# Patient Record
Sex: FEMALE | Race: WHITE | URBAN - METROPOLITAN AREA
[De-identification: names, ages, dates, MRNs, and addresses within clinical notes are randomized per-mention and may not be internally consistent; named-entity substitution may affect disease eponyms.]

---

## 2018-07-13 ENCOUNTER — APPOINTMENT (OUTPATIENT)
Dept: GENERAL RADIOLOGY | Facility: CLINIC | Age: 83
End: 2018-07-13
Attending: EMERGENCY MEDICINE
Payer: COMMERCIAL

## 2018-07-13 ENCOUNTER — HOSPITAL ENCOUNTER (EMERGENCY)
Facility: CLINIC | Age: 83
Discharge: HOME | End: 2018-07-13
Attending: EMERGENCY MEDICINE | Admitting: EMERGENCY MEDICINE
Payer: COMMERCIAL

## 2018-07-13 VITALS
WEIGHT: 94 LBS | SYSTOLIC BLOOD PRESSURE: 170 MMHG | OXYGEN SATURATION: 95 % | RESPIRATION RATE: 16 BRPM | TEMPERATURE: 97.4 F | HEART RATE: 79 BPM | DIASTOLIC BLOOD PRESSURE: 96 MMHG | BODY MASS INDEX: 18.36 KG/M2

## 2018-07-13 DIAGNOSIS — I49.8 SINUS ARRHYTHMIA: ICD-10-CM
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
## 2018-07-13 DIAGNOSIS — S51.011A SKIN TEAR OF RIGHT ELBOW WITHOUT COMPLICATION, INITIAL ENCOUNTER: ICD-10-CM

## 2018-07-13 LAB
ANION GAP SERPL CALCULATED.3IONS-SCNC: 13 MMOL/L (ref 3–14)
BASOPHILS # BLD AUTO: 0 10E9/L (ref 0–0.2)
BASOPHILS NFR BLD AUTO: 0.3 %
BUN SERPL-MCNC: 46 MG/DL (ref 7–30)
CALCIUM SERPL-MCNC: 9.8 MG/DL (ref 8.5–10.1)
CHLORIDE SERPL-SCNC: 106 MMOL/L (ref 94–109)
CO2 SERPL-SCNC: 24 MMOL/L (ref 20–32)
CREAT SERPL-MCNC: 0.85 MG/DL (ref 0.52–1.04)
DIFFERENTIAL METHOD BLD: ABNORMAL
EOSINOPHIL # BLD AUTO: 0 10E9/L (ref 0–0.7)
EOSINOPHIL NFR BLD AUTO: 0.2 %
ERYTHROCYTE [DISTWIDTH] IN BLOOD BY AUTOMATED COUNT: 15.3 % (ref 10–15)
GFR SERPL CREATININE-BSD FRML MDRD: 62 ML/MIN/1.7M2
GLUCOSE SERPL-MCNC: 106 MG/DL (ref 70–99)
HCT VFR BLD AUTO: 44.2 % (ref 35–47)
HGB BLD-MCNC: 14.7 G/DL (ref 11.7–15.7)
IMM GRANULOCYTES # BLD: 0 10E9/L (ref 0–0.4)
IMM GRANULOCYTES NFR BLD: 0.2 %
LYMPHOCYTES # BLD AUTO: 1.3 10E9/L (ref 0.8–5.3)
LYMPHOCYTES NFR BLD AUTO: 14.1 %
MCH RBC QN AUTO: 32.2 PG (ref 26.5–33)
MCHC RBC AUTO-ENTMCNC: 33.3 G/DL (ref 31.5–36.5)
MCV RBC AUTO: 97 FL (ref 78–100)
MONOCYTES # BLD AUTO: 0.8 10E9/L (ref 0–1.3)
MONOCYTES NFR BLD AUTO: 9.1 %
NEUTROPHILS # BLD AUTO: 6.8 10E9/L (ref 1.6–8.3)
NEUTROPHILS NFR BLD AUTO: 76.1 %
NRBC # BLD AUTO: 0 10*3/UL
NRBC BLD AUTO-RTO: 0 /100
PLATELET # BLD AUTO: 260 10E9/L (ref 150–450)
POTASSIUM SERPL-SCNC: 4.5 MMOL/L (ref 3.4–5.3)
RBC # BLD AUTO: 4.56 10E12/L (ref 3.8–5.2)
SODIUM SERPL-SCNC: 143 MMOL/L (ref 133–144)
TSH SERPL DL<=0.005 MIU/L-ACNC: 3.63 MU/L (ref 0.4–4)
WBC # BLD AUTO: 8.9 10E9/L (ref 4–11)

## 2018-07-13 PROCEDURE — 71046 X-RAY EXAM CHEST 2 VIEWS: CPT

## 2018-07-13 PROCEDURE — 93005 ELECTROCARDIOGRAM TRACING: CPT

## 2018-07-13 PROCEDURE — 96360 HYDRATION IV INFUSION INIT: CPT

## 2018-07-13 PROCEDURE — 80048 BASIC METABOLIC PNL TOTAL CA: CPT | Performed by: EMERGENCY MEDICINE

## 2018-07-13 PROCEDURE — 99285 EMERGENCY DEPT VISIT HI MDM: CPT | Mod: 25

## 2018-07-13 PROCEDURE — 84443 ASSAY THYROID STIM HORMONE: CPT | Performed by: EMERGENCY MEDICINE

## 2018-07-13 PROCEDURE — 85025 COMPLETE CBC W/AUTO DIFF WBC: CPT | Performed by: EMERGENCY MEDICINE

## 2018-07-13 PROCEDURE — 25000128 H RX IP 250 OP 636: Performed by: EMERGENCY MEDICINE

## 2018-07-13 RX ADMIN — SODIUM CHLORIDE 500 ML: 9 INJECTION, SOLUTION INTRAVENOUS at 13:22

## 2018-07-13 ASSESSMENT — ENCOUNTER SYMPTOMS
ABDOMINAL PAIN: 0
NECK PAIN: 0
BACK PAIN: 0
SHORTNESS OF BREATH: 0
WOUND: 1
ARTHRALGIAS: 1
NAUSEA: 0

## 2018-07-13 NOTE — ED AVS SNAPSHOT
Emergency Department    [address] [place] [address]    Phone:  [phone]    Fax:  [phone]                                       Elham Liu   MRN: [id number]    Department:   Emergency Department   Date of Visit:  7/13/2018           After Visit Summary Signature Page     I have received my discharge instructions, and my questions have been answered. I have discussed any challenges I see with this plan with the nurse or doctor.    ..........................................................................................................................................  Patient/Patient Representative Signature      ..........................................................................................................................................  Patient Representative Print Name and Relationship to Patient    ..................................................               ................................................  Date                                            Time    ..........................................................................................................................................  Reviewed by Signature/Title    ...................................................              ..............................................  Date                                                            Time

## 2018-07-13 NOTE — DISCHARGE INSTRUCTIONS
* LACERATION (All Closures)  A laceration is a cut through the skin. This will usually require stitches (sutures) or staples if it is deep. Minor cuts may be treated with a tape closure ( Steri-Strips ) or Dermabond skin glue.       HOME CARE:  PAIN MEDICINE: You may use acetaminophen (Tylenol) 650-1000 mg every 6 hours or ibuprofen (Motrin, Advil) 600 mg every 6-8 hours with food to control pain, if you are able to take these medicines. [NOTE: If you have chronic liver or kidney disease or ever had a stomach ulcer or GI bleeding, talk with your doctor before using these medicines.]  EXTREMITY, FACE or TRUNK WOUNDS:    Keep the wound clean and dry. If a bandage was applied and it becomes wet or dirty, replace it. Otherwise, leave it in place for the first 24 hours.    If stitches or staples were used, clean the wound daily. Protect the wound from sunlight and tanning lamps.    After removing the bandage, wash the area with soap and water. Use a wet cotton swab (Q tip) to loosen and remove any blood or crust that forms.    After cleaning, apply a thin layer of Polysporin or Bacitracin ointment. This will keep the wound clean and make it easier to remove the stitches or staples. Reapply a fresh bandage.    You may remove the bandage to shower as usual after the first 24 hours, but do not soak the area in water (no swimming) until the stitches or staples are removed.    If Steri-Strips were used, keep the area clean and dry. If it becomes wet, blot it dry with a towel. It is okay to take a brief shower, but avoid scrubbing the area.    If Dermabond skin adhesive was used, do not scratch, rub or pick at the adhesive film. Do not place tape directly over the film. Do not apply liquid, ointment or creams to the wound while the film is in place. Do not clean the wound with peroxide and do not apply ointments. Avoid activities that cause heavy sweating until the film has fallen off. Protect the wound from prolonged  exposure to sunlight or tanning lamps. You may shower as usual but do not soak the wound in water (no baths or swimming). The film will fall off by itself in 5-10 days.  SCALP WOUNDS: During the first two days, you may carefully rinse your hair in the shower to remove blood, glass or dirt particles. After two days, you may shower and shampoo your hair normally. Do not soak your scalp in the tub or go swimming until the stitches or staples have been removed.  MOUTH WOUNDS: Eat soft foods to reduce pain. If the cut is inside of your mouth, clean by rinsing after each meal and at bedtime with a mixture of equal parts water and Hydrogen Peroxide (do not swallow!). Or, you can use a cotton swab to directly apply Hydrogen Peroxide onto the cut.  After the wound is done healing, use sunscreen over the area whenever exposed for the next 6 minths to avoid a darker scar.     FOLLOW UP: Most skin wounds heal within ten days. Mouth and facial wounds heal within five days. However, even with proper treatment, a wound infection may sometimes occur. Therefore, you should check the wound daily for signs of infection listed below.  Stitches should be removed from the face within five days; stitches and staples should be removed from other parts of the body within 7-10 days. Unless you are told to come back to the emergency room, you may have your doctor or urgent care remove the stitches. If dissolving stitches were used in the mouth, these will fall out or dissolve without the need for removal. If tape closures ( Steri-Strips ) were used, remove them yourself if they have not fallen off after 7 days. If Dermabond skin glue was used, the film will fall off by itself in 5-10 days.      GET PROMPT MEDICAL ATTENTION  if any of the following occur:    Increasing pain in the wound    Redness, swelling or pus coming from the wound    Fever over 101 F (38.3 C) oral    If stitches or staples come apart or fall out or if Steri-Strips fall  "off before seven days    If the wound edges re-open    Bleeding not controlled by direct pressure    2161-6849 [employer]. [address] [place] [address], [place] [address]. All rights reserved. This information is not intended as a substitute for professional medical care. Always follow your healthcare professional's instructions.  This information has been modified by your health care provider with permission from the publisher.        * Heart Palpitations    Palpitations refers to the feeling that your heart is beating hard, fast or irregular. Some people describe it as \"pounding\" or \"skipped beats\". Palpitations may occur in persons with heart disease, but can also occur in healthy persons. Your doctor does not believe that anything dangerous is causing your symptoms at this time.  Heart-Related Causes:    Arrhythmia (a change from the heart's normal rhythm)    Disease of the heart valves  Non-Heart-Related Causes:    Certain medicines (such as asthma inhalers and decongestants)    Some herbal supplements, energy drinks and pills, and weight loss pills    Illegal stimulant drugs (such as cocaine, crank, methamphetamine, PCP)    Caffeine, alcohol and tobacco    Medical conditions such as thyroid disease, anemia, anxiety and panic disorder  Sometimes the cause cannot be found.  Home Care:  1. Avoid excess caffeine, alcohol, tobacco and any stimulant drugs.  2. Tell your doctor about any prescription or over-the-counter or herbal medicines you take.  Follow Up  with your doctor or as advised by our staff.  Get Prompt Medical Attention  if any of the following occur together with palpitations:    Weakness, dizziness, light-headed or fainting    Chest pain or shortness of breath    Rapid heart rate (over 120 beats per minute, at rest)    Palpitations that lasts over 20 minutes    Weakness of an arm or leg or one side of the face    Difficulty with speech or vision    3948-2669 [employer] [address] [place] [address]. All rights reserved. This information is not intended as a substitute for professional medical care. Always follow your healthcare professional's instructions.  This information has been modified by your health care provider with permission from the publisher.

## 2018-07-13 NOTE — ED PROVIDER NOTES
"  History     Chief Complaint:  Irregular Heart Beat    HPI   Elham Liu is a 89 year old female with a history of hypertension who presents to the ED for evaluation after a mechanical fall. The patient states that she tried to \"smoosh\" an insect in her bathroom and fell. She complains of right elbow pain and has a skin tear there. She denies any leg or hip injuries, back pain, spinal pain, chest pain, abdominal pain, nausea. She did not hit her head or lose consciousness. The patient used the life line on her walker to call EMS because she did not have the strength to get up off the floor. When EMS got to her home, they noticed the patient's heart rate was irregular.     Allergies:  Sulfa Drugs    Medications:    Norvasc  Aspirin  Tenormin  Calcium 500_D  Vitamin D  Hydrodiuril  Centrum    Past Medical History:    Hypertension    Past Surgical History:    Eye surgery  Appendectomy  Hysterectomy    Family History:    No past pertinent family history.    Social History:  The patient was taken to the ED by ambulance.  The patient lives alone in a condo.  The patient's daughter is on her way into the ED.  Smoking Status: Former smoker  Alcohol Use: Yes  Marital Status:      Review of Systems   Respiratory: Negative for shortness of breath.    Cardiovascular: Negative for chest pain.   Gastrointestinal: Negative for abdominal pain and nausea.   Musculoskeletal: Positive for arthralgias (RIght elbow). Negative for back pain and neck pain.   Skin: Positive for wound (Skin tear).   All other systems reviewed and are negative.    Physical Exam   First Vitals:  Patient Vitals for the past 24 hrs:   BP Temp Temp src Pulse Resp SpO2 Weight   07/13/18 1232 (!) 177/116 97.4  F (36.3  C) Oral 70 16 95 % 42.6 kg (94 lb)     Physical Exam  SKIN:  Warm and dry.  1.5 cm round skin tear with flap at the right medial elbow.  Clean wound.  No other skin trauma.  HEMATOLOGIC/IMMUNOLOGIC/LYMPHATIC:  No pallor.  No edema.  HENT:  No " facial or scalp trauma.  No oral or dental trauma.  No JVD.  EYES:  PERRL, no ocular trauma, normal EOM.  CARDIOVASCULAR:  Normal rate with a regular rhythm.  No murmur.  RESPIRATORY:  No respiratory distress, breath sounds equal and normal.  GASTROINTESTINAL:  Soft nontender abdomen.  No mass or distension.  MUSCULOSKELETAL:  Painless ROM of the head/neck/torso/limbs.  Cervical spine non-tender.  NEUROLOGIC:  Alert, conversant, GCS 15.  Oriented.  No aphasia or dysarthria.  No gross motor or sensory deficit.  PSYCHIATRIC:  Normal mood.    Emergency Department Course   ECG done at 85. ECG read at *. Indication: Irregular HR  Rate 85 bpm. RI interval * QT/QTc 390/464. QRS duration 84 P-R-T axes * 0 69.   Atrial fibrillation.  Anteroseptal infarct, age undetermined.  Abnormal ECG    Imaging:  Radiographic findings were communicated with the patient who voiced understanding of the findings.    Chest Xray, PA & LAT:  IMPRESSION: The lungs are clear. No focal pulmonary opacities. There  is cardiomegaly. The aorta is calcified and tortuous. Per Radiology.    Laboratory:  CBC: WBC 8.9, HGB 14.7,   BMP: Glucose 106(H), BUN 46(H) o/w WNL (Creat 0.85)  TSH: 3.63    Interventions:  1305 NS Bolus IV    Emergency Department Course:  Nursing notes and vitals reviewed.  I performed an exam of the patient as documented above.     1358  I rechecked the patient and also spoke to her daughter about the findings and plan.    1410 I cleaned the patient's skin tear and applied bacitracin and a bandage.    Findings and plan explained to the patient. Patient discharged home with instructions regarding supportive care, medications, and reasons to return. The importance of close follow-up was reviewed.     Impression & Plan      Medical Decision Making:  Elham Liu is a 89 year old female who suffered a mechanical fall and injured the right upper extremity with a skin tear. Well tolerated painless range of motion of the right upper  extremity, so she did not require imaging of that limb. EMS noted an irregular pulse, so she was evaluated for that, as well as generalized weakness which seemed to limit her from rising after her fall. They thought she might be suffering atrial fibrillation, but it appears she has sinus arrhythmia. Screening tests were otherwise reassuring regarding arrhythmia and generalized weakness. She ambulated well here in the ED and feels well enough to be discharged back to her condo. Her daughter states she will likely stay with the patient tonight just to keep an eye on her. I advised primary care follow up given this episode of generalized weakness, the fall and sinus arrhythmia. I advised to keep an eye on the wound regarding infection.    Diagnosis:    ICD-10-CM    1. Fall, initial encounter W19.XXXA TSH with free T4 reflex   2. Skin tear of right elbow without complication, initial encounter S51.011A    3. Sinus arrhythmia I49.9      Disposition:  The patient was discharged home.    7/13/2018    EMERGENCY DEPARTMENT    I, Jasmin Hu, am serving as a scribe at 1254 on July 13, 2018  to document services personally performed by Dr. Alexander, based on my observations and the provider's statements to me.         Cedric Alexander MD  07/14/18 6038

## 2018-07-13 NOTE — ED NOTES
Bed: ED19  Expected date:   Expected time:   Means of arrival:   Comments:  ever - 516 - 89 F cardiac arrhythmia eta 1220

## 2018-07-13 NOTE — ED AVS SNAPSHOT
Emergency Department    [address] [place] [address]    Phone:  [phone]    Fax:  [phone]                                       Elham Liu   MRN: [id number]    Department:   Emergency Department   Date of Visit:  7/13/2018           Patient Information     Date Of Birth          3/21/1921        Your diagnoses for this visit were:     Fall, initial encounter     Skin tear of right elbow without complication, initial encounter     Sinus arrhythmia        You were seen by Cedric Alexander MD.      Follow-up Information     Please follow up.    Why:  watch for wound infection - follow up with your MD to recheck your heart rhythm        Discharge Instructions          * LACERATION (All Closures)  A laceration is a cut through the skin. This will usually require stitches (sutures) or staples if it is deep. Minor cuts may be treated with a tape closure ( Steri-Strips ) or Dermabond skin glue.       HOME CARE:  PAIN MEDICINE: You may use acetaminophen (Tylenol) 650-1000 mg every 6 hours or ibuprofen (Motrin, Advil) 600 mg every 6-8 hours with food to control pain, if you are able to take these medicines. [NOTE: If you have chronic liver or kidney disease or ever had a stomach ulcer or GI bleeding, talk with your doctor before using these medicines.]  EXTREMITY, FACE or TRUNK WOUNDS:    Keep the wound clean and dry. If a bandage was applied and it becomes wet or dirty, replace it. Otherwise, leave it in place for the first 24 hours.    If stitches or staples were used, clean the wound daily. Protect the wound from sunlight and tanning lamps.    After removing the bandage, wash the area with soap and water. Use a wet cotton swab (Q tip) to loosen and remove any blood or crust that forms.    After cleaning, apply a thin layer of Polysporin or Bacitracin ointment. This will keep the wound clean and make it easier to remove the stitches or staples. Reapply a fresh bandage.    You may remove  the bandage to shower as usual after the first 24 hours, but do not soak the area in water (no swimming) until the stitches or staples are removed.    If Steri-Strips were used, keep the area clean and dry. If it becomes wet, blot it dry with a towel. It is okay to take a brief shower, but avoid scrubbing the area.    If Dermabond skin adhesive was used, do not scratch, rub or pick at the adhesive film. Do not place tape directly over the film. Do not apply liquid, ointment or creams to the wound while the film is in place. Do not clean the wound with peroxide and do not apply ointments. Avoid activities that cause heavy sweating until the film has fallen off. Protect the wound from prolonged exposure to sunlight or tanning lamps. You may shower as usual but do not soak the wound in water (no baths or swimming). The film will fall off by itself in 5-10 days.  SCALP WOUNDS: During the first two days, you may carefully rinse your hair in the shower to remove blood, glass or dirt particles. After two days, you may shower and shampoo your hair normally. Do not soak your scalp in the tub or go swimming until the stitches or staples have been removed.  MOUTH WOUNDS: Eat soft foods to reduce pain. If the cut is inside of your mouth, clean by rinsing after each meal and at bedtime with a mixture of equal parts water and Hydrogen Peroxide (do not swallow!). Or, you can use a cotton swab to directly apply Hydrogen Peroxide onto the cut.  After the wound is done healing, use sunscreen over the area whenever exposed for the next 6 minths to avoid a darker scar.     FOLLOW UP: Most skin wounds heal within ten days. Mouth and facial wounds heal within five days. However, even with proper treatment, a wound infection may sometimes occur. Therefore, you should check the wound daily for signs of infection listed below.  Stitches should be removed from the face within five days; stitches and staples should be removed from other parts  "of the body within 7-10 days. Unless you are told to come back to the emergency room, you may have your doctor or urgent care remove the stitches. If dissolving stitches were used in the mouth, these will fall out or dissolve without the need for removal. If tape closures ( Steri-Strips ) were used, remove them yourself if they have not fallen off after 7 days. If Dermabond skin glue was used, the film will fall off by itself in 5-10 days.      GET PROMPT MEDICAL ATTENTION  if any of the following occur:    Increasing pain in the wound    Redness, swelling or pus coming from the wound    Fever over 101 F (38.3 C) oral    If stitches or staples come apart or fall out or if Steri-Strips fall off before seven days    If the wound edges re-open    Bleeding not controlled by direct pressure    5392-9646 [employer]. [address] [place] [address]. All rights reserved. This information is not intended as a substitute for professional medical care. Always follow your healthcare professional's instructions.  This information has been modified by your health care provider with permission from the publisher.        * Heart Palpitations    Palpitations refers to the feeling that your heart is beating hard, fast or irregular. Some people describe it as \"pounding\" or \"skipped beats\". Palpitations may occur in persons with heart disease, but can also occur in healthy persons. Your doctor does not believe that anything dangerous is causing your symptoms at this time.  Heart-Related Causes:    Arrhythmia (a change from the heart's normal rhythm)    Disease of the heart valves  Non-Heart-Related Causes:    Certain medicines (such as asthma inhalers and decongestants)    Some herbal supplements, energy drinks and pills, and weight loss pills    Illegal stimulant drugs (such as cocaine, crank, methamphetamine, PCP)    Caffeine, alcohol and tobacco    Medical conditions such as thyroid disease, anemia, anxiety " and panic disorder  Sometimes the cause cannot be found.  Home Care:  1. Avoid excess caffeine, alcohol, tobacco and any stimulant drugs.  2. Tell your doctor about any prescription or over-the-counter or herbal medicines you take.  Follow Up  with your doctor or as advised by our staff.  Get Prompt Medical Attention  if any of the following occur together with palpitations:    Weakness, dizziness, light-headed or fainting    Chest pain or shortness of breath    Rapid heart rate (over 120 beats per minute, at rest)    Palpitations that lasts over 20 minutes    Weakness of an arm or leg or one side of the face    Difficulty with speech or vision    7631-2474 [employer]. [address] [place] [address]. All rights reserved. This information is not intended as a substitute for professional medical care. Always follow your healthcare professional's instructions.  This information has been modified by your health care provider with permission from the publisher.          24 Hour Appointment Hotline       To make an appointment at any [facility], call [phone] ([phone]). If you don't have a family doctor or clinic, we will help you find one. [facility] are conveniently located to serve the needs of you and your family.             Review of your medicines      Our records show that you are taking the medicines listed below. If these are incorrect, please call your family doctor or clinic.        Dose / Directions Last dose taken    aspirin 81 MG tablet        Take by mouth daily   Refills:  0        atenolol 25 MG tablet   Commonly known as:  TENORMIN   Dose:  25 mg        Take 25 mg by mouth daily.   Refills:  0        CALCIUM 500 + D PO        Take  by mouth.   Refills:  0        CENTRUM PO        Take  by mouth.   Refills:  0        cholecalciferol 1000 UNIT tablet   Commonly known as:  vitamin D3   Dose:  1 tablet        Take 1 tablet by mouth daily.   Refills:  0         hydrochlorothiazide 25 MG tablet   Commonly known as:  HYDRODIURIL   Dose:  25 mg        Take 25 mg by mouth daily.   Refills:  0        NORVASC PO        Refills:  0                Procedures and tests performed during your visit     Basic metabolic panel    CBC with platelets differential    Chest XR,  PA & LAT    TSH with free T4 reflex      Orders Needing Specimen Collection     None      Pending Results     No orders found from 7/11/2018 to 7/14/2018.            Pending Culture Results     No orders found from 7/11/2018 to 7/14/2018.            Pending Results Instructions     If you had any lab results that were not finalized at the time of your Discharge, you can call the ED Lab Result RN at [phone]. You will be contacted by this team for any positive Lab results or changes in treatment. The nurses are available 7 days a week from 10A to 6:30P.  You can leave a message 24 hours per day and they will return your call.        Test Results From Your Hospital Stay        7/13/2018 12:53 PM      Component Results     Component Value Ref Range & Units Status    WBC 8.9 4.0 - 11.0 10e9/L Final    RBC Count 4.56 3.8 - 5.2 10e12/L Final    Hemoglobin 14.7 11.7 - 15.7 g/dL Final    Hematocrit 44.2 35.0 - 47.0 % Final    MCV 97 78 - 100 fl Final    MCH 32.2 26.5 - 33.0 pg Final    MCHC 33.3 31.5 - 36.5 g/dL Final    RDW 15.3 (H) 10.0 - 15.0 % Final    Platelet Count 260 150 - 450 10e9/L Final    Diff Method Automated Method  Final    % Neutrophils 76.1 % Final    % Lymphocytes 14.1 % Final    % Monocytes 9.1 % Final    % Eosinophils 0.2 % Final    % Basophils 0.3 % Final    % Immature Granulocytes 0.2 % Final    Nucleated RBCs 0 0 /100 Final    Absolute Neutrophil 6.8 1.6 - 8.3 10e9/L Final    Absolute Lymphocytes 1.3 0.8 - 5.3 10e9/L Final    Absolute Monocytes 0.8 0.0 - 1.3 10e9/L Final    Absolute Eosinophils 0.0 0.0 - 0.7 10e9/L Final    Absolute Basophils 0.0 0.0 - 0.2 10e9/L Final    Abs Immature  Granulocytes 0.0 0 - 0.4 10e9/L Final    Absolute Nucleated RBC 0.0  Final         7/13/2018  1:13 PM      Component Results     Component Value Ref Range & Units Status    Sodium 143 133 - 144 mmol/L Final    Potassium 4.5 3.4 - 5.3 mmol/L Final    Chloride 106 94 - 109 mmol/L Final    Carbon Dioxide 24 20 - 32 mmol/L Final    Anion Gap 13 3 - 14 mmol/L Final    Glucose 106 (H) 70 - 99 mg/dL Final    Urea Nitrogen 46 (H) 7 - 30 mg/dL Final    Creatinine 0.85 0.52 - 1.04 mg/dL Final    GFR Estimate 62 >60 mL/min/1.7m2 Final    Non  GFR Calc    GFR Estimate If Black 75 >60 mL/min/1.7m2 Final    African American GFR Calc    Calcium 9.8 8.5 - 10.1 mg/dL Final         7/13/2018  2:04 PM      Narrative     XR CHEST 2 VW 7/13/2018 1:36 PM    HISTORY: Atrial fibrillation.    COMPARISON: October 30, 2011.        Impression     IMPRESSION: The lungs are clear. No focal pulmonary opacities. There  is cardiomegaly. The aorta is calcified and tortuous.    ELY REDDY MD         7/13/2018  1:48 PM      Component Results     Component Value Ref Range & Units Status    TSH 3.63 0.40 - 4.00 mU/L Final                Clinical Quality Measure: Blood Pressure Screening     Your blood pressure was checked while you were in the emergency department today. The last reading we obtained was  BP: (!) 177/116 . Please read the guidelines below about what these numbers mean and what you should do about them.  If your systolic blood pressure (the top number) is less than 120 and your diastolic blood pressure (the bottom number) is less than 80, then your blood pressure is normal. There is nothing more that you need to do about it.  If your systolic blood pressure (the top number) is 120-139 or your diastolic blood pressure (the bottom number) is 80-89, your blood pressure may be higher than it should be. You should have your blood pressure rechecked within a year by a primary care provider.  If your systolic blood pressure  "(the top number) is 140 or greater or your diastolic blood pressure (the bottom number) is 90 or greater, you may have high blood pressure. High blood pressure is treatable, but if left untreated over time it can put you at risk for heart attack, stroke, or kidney failure. You should have your blood pressure rechecked by a primary care provider within the next 4 weeks.  If your provider in the emergency department today gave you specific instructions to follow-up with your doctor or provider even sooner than that, you should follow that instruction and not wait for up to 4 weeks for your follow-up visit.        Thank you for choosing [facility]       Thank you for choosing [facility] for your care. Our goal is always to provide you with excellent care. Hearing back from our patients is one way we can continue to improve our services. Please take a few minutes to complete the written survey that you may receive in the mail after you visit with us. Thank you!        [employer] Information     [employer] lets you send messages to your doctor, view your test results, renew your prescriptions, schedule appointments and more. To sign up, go to [email or web address] . Click on \"Log in\" on the left side of the screen, which will take you to the Welcome page. Then click on \"Sign up Now\" on the right side of the page.     You will be asked to enter the access code listed below, as well as some personal information. Please follow the directions to create your username and password.     Your access code is: [email or web address]  Expires: 10/11/2018  2:19 PM     Your access code will  in 90 days. If you need help or a new code, please call your [facility] clinic or [phone].        Care EveryWhere ID     This is your Care EveryWhere ID. This could be used by other organizations to access your [facility] medical records  [id number]        Equal Access to Services     ENMANUEL ORANTES : jigar Butt qaybta " parminder page ah. So [facility] [phone].    ATENCIÓN: Si habla español, tiene a dash disposición servicios gratuitos de asistencia lingüística. Llame al [phone].    We comply with applicable federal civil rights laws and [place] laws. We do not discriminate on the basis of race, color, national origin, age, disability, sex, sexual orientation, or gender identity.            After Visit Summary       This is your record. Keep this with you and show to your community pharmacist(s) and doctor(s) at your next visit.

## 2018-07-14 LAB — INTERPRETATION ECG - MUSE: NORMAL

## 2018-08-01 ENCOUNTER — OFFICE VISIT (OUTPATIENT)
Dept: URGENT CARE | Facility: URGENT CARE | Age: 83
End: 2018-08-01
Payer: COMMERCIAL

## 2018-08-01 VITALS
HEART RATE: 68 BPM | OXYGEN SATURATION: 98 % | TEMPERATURE: 98.4 F | SYSTOLIC BLOOD PRESSURE: 154 MMHG | BODY MASS INDEX: 18.55 KG/M2 | WEIGHT: 95 LBS | DIASTOLIC BLOOD PRESSURE: 60 MMHG

## 2018-08-01 DIAGNOSIS — L03.115 CELLULITIS OF FOOT, RIGHT: Primary | ICD-10-CM

## 2018-08-01 PROCEDURE — 99213 OFFICE O/P EST LOW 20 MIN: CPT | Performed by: FAMILY MEDICINE

## 2018-08-01 RX ORDER — CEPHALEXIN 500 MG/1
500 CAPSULE ORAL 3 TIMES DAILY
Qty: 30 CAPSULE | Refills: 0 | Status: SHIPPED | OUTPATIENT
Start: 2018-08-01 | End: 2018-08-11

## 2018-08-01 NOTE — PATIENT INSTRUCTIONS
Discharge Instructions for Cellulitis  You have been diagnosed with cellulitis. This is an infection in the deepest layer of the skin. In some cases, the infection also affects the muscle. Cellulitis is caused by bacteria. The bacteria can enter the body through broken skin. This can happen with a cut, scratch, animal bite, or an insect bite that has been scratched. You may have been treated in the hospital with antibiotics and fluids. You will likely be given a prescription for antibiotics to take at home. This sheet will help you take care of yourself at home.  Home care  When you are home:    Take the prescribed antibiotic medicine you are given as directed until it is gone. Take it even if you feel better. It treats the infection and stops it from returning. Not taking all the medicine can make future infections hard to treat.    Keep the infected area clean.    When possible, raise the infected area above the level of your heart. This helps keep swelling down.    Talk with your healthcare provider if you are in pain. Ask what kind of over-the-counter medicine you can take for pain.    Apply clean bandages as advised.    Take your temperature once a day for a week.    Wash your hands often to prevent spreading the infection.  In the future, wash your hands before and after you touch cuts, scratches, or bandages. This will help prevent infection.   When to call your healthcare provider  Call your healthcare provider immediately if you have any of the following:    Difficulty or pain when moving the joints above or below the infected area    Discharge or pus draining from the area    Fever of 100.4 F (38 C) or higher, or as directed by your healthcare provider    Pain that gets worse in or around the infected     Redness that gets worse in or around the infected area, particularly if the area of redness expands to a wider area    Shaking chills    Swelling of the infected area    Vomiting   Date Last Reviewed:  8/1/2016 2000-2017 [employer]. [address] [place] [address], [place] [address]. All rights reserved. This information is not intended as a substitute for professional medical care. Always follow your healthcare professional's instructions.

## 2018-08-01 NOTE — MR AVS SNAPSHOT
After Visit Summary   8/1/2018    Elham Liu    MRN: [id number]           Patient Information     Date Of Birth          3/21/1921        Visit Information        Provider Department      8/1/2018 4:05 PM Toshia Gilbert MD [facility]        Today's Diagnoses     Cellulitis of foot, right    -  1      Care Instructions      Discharge Instructions for Cellulitis  You have been diagnosed with cellulitis. This is an infection in the deepest layer of the skin. In some cases, the infection also affects the muscle. Cellulitis is caused by bacteria. The bacteria can enter the body through broken skin. This can happen with a cut, scratch, animal bite, or an insect bite that has been scratched. You may have been treated in the hospital with antibiotics and fluids. You will likely be given a prescription for antibiotics to take at home. This sheet will help you take care of yourself at home.  Home care  When you are home:    Take the prescribed antibiotic medicine you are given as directed until it is gone. Take it even if you feel better. It treats the infection and stops it from returning. Not taking all the medicine can make future infections hard to treat.    Keep the infected area clean.    When possible, raise the infected area above the level of your heart. This helps keep swelling down.    Talk with your healthcare provider if you are in pain. Ask what kind of over-the-counter medicine you can take for pain.    Apply clean bandages as advised.    Take your temperature once a day for a week.    Wash your hands often to prevent spreading the infection.  In the future, wash your hands before and after you touch cuts, scratches, or bandages. This will help prevent infection.   When to call your healthcare provider  Call your healthcare provider immediately if you have any of the following:    Difficulty or pain when moving the joints above or below the infected  "area    Discharge or pus draining from the area    Fever of 100.4 F (38 C) or higher, or as directed by your healthcare provider    Pain that gets worse in or around the infected     Redness that gets worse in or around the infected area, particularly if the area of redness expands to a wider area    Shaking chills    Swelling of the infected area    Vomiting   Date Last Reviewed: 8/1/2016 2000-2017 [employer] [address] [place] [address], [place] [address]. All rights reserved. This information is not intended as a substitute for professional medical care. Always follow your healthcare professional's instructions.                Follow-ups after your visit        Who to contact     If you have questions or need follow up information about today's clinic visit or your schedule please contact [facility] directly at [phone].  Normal or non-critical lab and imaging results will be communicated to you by [employer], letter or phone within 4 business days after the clinic has received the results. If you do not hear from us within 7 days, please contact the clinic through [employer] or phone. If you have a critical or abnormal lab result, we will notify you by phone as soon as possible.  Submit refill requests through [facility] or call your pharmacy and they will forward the refill request to us. Please allow 3 business days for your refill to be completed.          Additional Information About Your Visit        [employer] Information     [facility] lets you send messages to your doctor, view your test results, renew your prescriptions, schedule appointments and more. To sign up, go to [email or web address] . Click on \"Log in\" on the left side of the screen, which will take you to the Welcome page. Then click on \"Sign up Now\" on the right side of the page.     You will be asked to enter the access code listed below, as well as some personal information. Please follow the directions to " create your username and password.     Your access code is: [email or web address]  Expires: 10/11/2018  2:19 PM     Your access code will  in 90 days. If you need help or a new code, please call your [facility] or [phone].        Care EveryWhere ID     This is your Care EveryWhere ID. This could be used by other organizations to access your [facility] medical records  [id number]        Your Vitals Were     Pulse Temperature Pulse Oximetry Breastfeeding? BMI (Body Mass Index)       68 98.4  F (36.9  C) (Oral) 98% No 18.55 kg/m2        Blood Pressure from Last 3 Encounters:   18 158/80   18 (!) 170/96   16 114/74    Weight from Last 3 Encounters:   18 95 lb (43.1 kg)   18 94 lb (42.6 kg)   16 99 lb 6.4 oz (45.1 kg)              Today, you had the following     No orders found for display         Today's Medication Changes          These changes are accurate as of 18  5:04 PM.  If you have any questions, ask your nurse or doctor.               Start taking these medicines.        Dose/Directions    cephALEXin 500 MG capsule   Commonly known as:  KEFLEX   Used for:  Cellulitis of foot, right   Started by:  Toshia Gilbert MD        Dose:  500 mg   Take 1 capsule (500 mg) by mouth 3 times daily for 10 days   Quantity:  30 capsule   Refills:  0            Where to get your medicines      These medications were sent to [facility] [address] [place] [address] [address]     Phone:  [phone]     cephALEXin 500 MG capsule                Primary Care Provider Office Phone # Fax #    Pablo Griggs [phone]       [employer] [facility] [address] [facility] [address]        Equal Access to Services     [facility] : Tarun Higgins, waluis fernandoda luqadaha, qaybta kaalparminder maguire. [facility] [phone].    ATENCIÓN: Si katya mcintosh, luz a dash disposición  servicios gratuitos de asistencia lingüística. Rekha gage [phone].    We comply with applicable federal civil rights laws and [place] laws. We do not discriminate on the basis of race, color, national origin, age, disability, sex, sexual orientation, or gender identity.            Thank you!     Thank you for choosing [facility]  for your care. Our goal is always to provide you with excellent care. Hearing back from our patients is one way we can continue to improve our services. Please take a few minutes to complete the written survey that you may receive in the mail after your visit with us. Thank you!             Your Updated Medication List - Protect others around you: Learn how to safely use, store and throw away your medicines at www.disposemymeds.org.          This list is accurate as of 8/1/18  5:04 PM.  Always use your most recent med list.                   Brand Name Dispense Instructions for use Diagnosis    aspirin 81 MG tablet      Take by mouth daily        atenolol 25 MG tablet    TENORMIN     Take 25 mg by mouth daily.        CALCIUM 500 + D PO      Take  by mouth.        CENTRUM PO      Take  by mouth.        cephALEXin 500 MG capsule    KEFLEX    30 capsule    Take 1 capsule (500 mg) by mouth 3 times daily for 10 days    Cellulitis of foot, right       cholecalciferol 1000 UNIT tablet    vitamin D3     Take 1 tablet by mouth daily.        hydrochlorothiazide 25 MG tablet    HYDRODIURIL     Take 25 mg by mouth daily.        NORVASC PO

## 2020-06-01 ENCOUNTER — RECORDS - HEALTHEAST (OUTPATIENT)
Dept: LAB | Facility: CLINIC | Age: 85
End: 2020-06-01

## 2020-06-01 LAB
ANION GAP SERPL CALCULATED.3IONS-SCNC: 9 MMOL/L (ref 5–18)
BUN SERPL-MCNC: 25 MG/DL (ref 8–28)
CALCIUM SERPL-MCNC: 9 MG/DL (ref 8.5–10.5)
CHLORIDE BLD-SCNC: 107 MMOL/L (ref 98–107)
CO2 SERPL-SCNC: 27 MMOL/L (ref 22–31)
CREAT SERPL-MCNC: 0.72 MG/DL (ref 0.6–1.1)
GFR SERPL CREATININE-BSD FRML MDRD: >60 ML/MIN/1.73M2
GLUCOSE BLD-MCNC: 89 MG/DL (ref 70–125)
HGB BLD-MCNC: 12.7 G/DL (ref 12–16)
POTASSIUM BLD-SCNC: 3.7 MMOL/L (ref 3.5–5)
SODIUM SERPL-SCNC: 143 MMOL/L (ref 136–145)

## 2020-06-05 ENCOUNTER — RECORDS - HEALTHEAST (OUTPATIENT)
Dept: LAB | Facility: CLINIC | Age: 85
End: 2020-06-05

## 2020-06-08 LAB
ANION GAP SERPL CALCULATED.3IONS-SCNC: 6 MMOL/L (ref 5–18)
BUN SERPL-MCNC: 27 MG/DL (ref 8–28)
CALCIUM SERPL-MCNC: 9.2 MG/DL (ref 8.5–10.5)
CHLORIDE BLD-SCNC: 105 MMOL/L (ref 98–107)
CO2 SERPL-SCNC: 29 MMOL/L (ref 22–31)
CREAT SERPL-MCNC: 0.68 MG/DL (ref 0.6–1.1)
GFR SERPL CREATININE-BSD FRML MDRD: >60 ML/MIN/1.73M2
GLUCOSE BLD-MCNC: 67 MG/DL (ref 70–125)
POTASSIUM BLD-SCNC: 4.3 MMOL/L (ref 3.5–5)
SODIUM SERPL-SCNC: 140 MMOL/L (ref 136–145)

## 2023-12-08 NOTE — PROGRESS NOTES
SUBJECTIVE:  Elham Liu is a 89 year old female who presents to the clinic today for a redness and swelling of the right foot \  Onset of redness was 3 day(s) ago.    sudden onset.  Location  Foot.dorsum and also involving the right toe   Quality/symptoms : painful and red   Symptoms are moderate   Recent exposure history: none known    Associated symptoms include: nothing.    Past Medical History:   Diagnosis Date     Hypertension      Current Outpatient Prescriptions   Medication Sig Dispense Refill     AmLODIPine Besylate (NORVASC PO)        aspirin 81 MG tablet Take by mouth daily       atenolol (TENORMIN) 25 MG tablet Take 25 mg by mouth daily.       Calcium Carbonate-Vitamin D (CALCIUM 500 + D PO) Take  by mouth.       cephALEXin (KEFLEX) 500 MG capsule Take 1 capsule (500 mg) by mouth 3 times daily for 10 days 30 capsule 0     cholecalciferol (VITAMIN D) 1000 UNIT tablet Take 1 tablet by mouth daily.       hydrochlorothiazide (HYDRODIURIL) 25 MG tablet Take 25 mg by mouth daily.       Multiple Vitamins-Minerals (CENTRUM PO) Take  by mouth.       Social History   Substance Use Topics     Smoking status: Former Smoker     Smokeless tobacco: Never Used     Alcohol use 0.5 oz/week     1 Standard drinks or equivalent per week       ROS:  Review of systems negative except as stated above.    EXAM:   /80 (BP Location: Right arm, Patient Position: Right side, Cuff Size: Adult Regular)  Pulse 68  Temp 98.4  F (36.9  C) (Oral)  Wt 95 lb (43.1 kg)  SpO2 98%  Breastfeeding? No  BMI 18.55 kg/m2  GENERAL: alert, no acute distress.  SKIN:cellultis of right foot   Distribution: localized  Location: foot    Color: redness of the right foot over the dorsum of the foot  ,  Lesion type: cellulitis with edema ,GENERAL APPEARANCE: healthy, alert and no distress  EYES: EOMI,  PERRL, conjunctiva clear  NECK: supple, non-tender to palpation, no adenopathy noted  RESP: lungs clear to auscultation - no rales, rhonchi or  wheezes  CV: regular rates and rhythm, normal S1 S2, no murmur noted    ASSESSMENT:  Elham was seen today for urgent care.    Diagnoses and all orders for this visit:    Cellulitis of foot, right  -     cephALEXin (KEFLEX) 500 MG capsule; Take 1 capsule (500 mg) by mouth 3 times daily for 10 days      Discussed about elevated Bp check with pt and her daughter   Recheck was slightly better         PLAN:  1) See today's orders.  2) Follow-up with primary clinic if not improving  Discussed with pt to keep foot elevated   Follow up if  symptoms fail to improve or worsens   Pt understood and agreed with plan        ambulatory